# Patient Record
Sex: FEMALE | Race: WHITE | URBAN - NONMETROPOLITAN AREA
[De-identification: names, ages, dates, MRNs, and addresses within clinical notes are randomized per-mention and may not be internally consistent; named-entity substitution may affect disease eponyms.]

---

## 2017-11-07 ENCOUNTER — OFFICE VISIT (OUTPATIENT)
Dept: FAMILY MEDICINE CLINIC | Age: 62
End: 2017-11-07
Payer: MEDICAID

## 2017-11-07 VITALS
HEART RATE: 105 BPM | OXYGEN SATURATION: 96 % | BODY MASS INDEX: 35.32 KG/M2 | DIASTOLIC BLOOD PRESSURE: 80 MMHG | WEIGHT: 212 LBS | SYSTOLIC BLOOD PRESSURE: 114 MMHG | HEIGHT: 65 IN

## 2017-11-07 DIAGNOSIS — Z72.0 TOBACCO USE: ICD-10-CM

## 2017-11-07 DIAGNOSIS — G51.0 BELL'S PALSY: Primary | ICD-10-CM

## 2017-11-07 PROCEDURE — 99203 OFFICE O/P NEW LOW 30 MIN: CPT | Performed by: FAMILY MEDICINE

## 2017-11-07 RX ORDER — CYCLOBENZAPRINE HCL 10 MG
10 TABLET ORAL 3 TIMES DAILY PRN
COMMUNITY

## 2017-11-07 RX ORDER — MINERAL OIL AND WHITE PETROLATUM 150; 830 MG/G; MG/G
OINTMENT OPHTHALMIC
COMMUNITY

## 2017-11-07 RX ORDER — VALACYCLOVIR HYDROCHLORIDE 1 G/1
1000 TABLET, FILM COATED ORAL DAILY
COMMUNITY

## 2017-11-07 RX ORDER — PREDNISONE 20 MG/1
20 TABLET ORAL DAILY
COMMUNITY

## 2017-11-07 RX ORDER — POLYVINYL ALCOHOL 14 MG/ML
1 SOLUTION/ DROPS OPHTHALMIC 4 TIMES DAILY
COMMUNITY

## 2017-11-07 NOTE — PROGRESS NOTES
Subjective:      Patient ID: Nicolette Westbrook is a 63 y.o. female. 9 days ago started with headache and ear pain. Then developed facial drooping on her left side. No other neurological type problems. She went to the ER and had ct and mri of head and an echo. Was diagnosed with bells palsy. Her head pain is improving and per report her facial weakness is improving. Review of Systems   Neurological: Positive for weakness (facial). All other systems reviewed and are negative. Objective:   Physical Exam   Constitutional: She is oriented to person, place, and time. She appears well-developed and well-nourished. HENT:   Head: Normocephalic and atraumatic. Right Ear: External ear normal.   Left Ear: External ear normal.   Nose: Nose normal.   Mouth/Throat: Oropharynx is clear and moist. No oropharyngeal exudate. Eyes: Conjunctivae and EOM are normal. Pupils are equal, round, and reactive to light. Neck: Muscular tenderness present. No tracheal deviation and normal range of motion present. No thyromegaly present. Cardiovascular: Normal rate, regular rhythm and normal heart sounds. No murmur heard. Pulmonary/Chest: Effort normal and breath sounds normal.   Abdominal: Soft. Bowel sounds are normal.   Musculoskeletal: She exhibits no edema. Lymphadenopathy:     She has no cervical adenopathy. Neurological: She is alert and oriented to person, place, and time. Left sided facial droop   Skin: Skin is warm and dry. Psychiatric: She has a normal mood and affect. Nursing note and vitals reviewed. Assessment:      Left sided bells palsy  Tobacco use   Obese       Plan:      Reviewed proper eye care, home therapy for muscle stimulation. Offered therapy which was refused. Tobacco cessation. Proper diet fitness and weight loss discussed. Wellness issues addressed and encouraged.

## 2017-11-07 NOTE — PATIENT INSTRUCTIONS
mad at yourself if you smoke again. Make a list of things you learned and think about when you want to try again, such as next week, next month, or next year. Where can you learn more? Go to https://chairam.Soneter. org and sign in to your WhistleTalk account. Enter Z363 in the [facility] box to learn more about \"Stopping Smoking: Care Instructions. \"     If you do not have an account, please click on the \"Sign Up Now\" link. Current as of: March 20, 2017  Content Version: 11.3  © 8783-8886 [employer]. Care instructions adapted under license by [facility] ([facility]). If you have questions about a medical condition or this instruction, always ask your healthcare professional. [address] any warranty or liability for your use of this information. Patient Education        Bell's Palsy: Care Instructions  Your Care Instructions    Bell's palsy is paralysis or weakness of the muscles on one side of the face. Often people with Bell's palsy have a droop on one side of the mouth and have trouble completely shutting the eye on the same side. Bell's palsy can also interfere with the sense of taste. These things happen when a nerve in your face becomes inflamed. Bell's palsy is not caused by a stroke. The cause of the nerve inflammation is not known. But some experts think that a virus may cause it. Because of this, doctors sometimes prescribe antiviral medicine to treat it. You also may get medicine to reduce swelling. Bell's palsy usually gets better on its own in a few weeks or months. Follow-up care is a key part of your treatment and safety. Be sure to make and go to all appointments, and call your doctor if you are having problems. It's also a good idea to know your test results and keep a list of the medicines you take. How can you care for yourself at home? · Take your medicines exactly as prescribed.  Call your doctor if you think you are having a problem with your medicine. You will get more details on the specific medicines your doctor prescribes. · Use artificial tears or ointment if your eyes are too dry. Bell's palsy can make your lower eyelid droop, causing a dry eye. · If you cannot completely close your eye, consider using an eye patch while you sleep. · Help yourself blink by using your finger to close and open your eyelid. This may help keep your eye moist.  · Wear glasses or goggles to keep dust and dirt out of your eye. · As feeling comes back to your face, massage your forehead, cheeks, and lips. Massage may make the muscles in your face stronger. · Brush and floss your teeth often to help prevent tooth decay. Bell's palsy can dry up the spit on one side of your mouth. This increases the risk of tooth decay. When should you call for help? Call 911 anytime you think you may need emergency care. For example, call if:  · You have symptoms of a stroke. These may include:  ¨ Sudden numbness, tingling, weakness, or loss of movement in your face, arm, or leg, especially on only one side of your body. ¨ Sudden vision changes. ¨ Sudden trouble speaking. ¨ Sudden confusion or trouble understanding simple statements. ¨ Sudden problems with walking or balance. ¨ A sudden, severe headache that is different from past headaches. Call your doctor now or seek immediate medical care if:  · You have numbness or weakness that spreads beyond one side of your face. · You have a skin rash or eye pain or redness, or light bothers your eyes. · You have a new or worse headache. Watch closely for changes in your health, and be sure to contact your doctor if:  · You do not get better as expected. Where can you learn more? Go to https://[facility]am.[employer] H2O. org and sign in to your iHandle account. Enter P168 in the [email or web address] box to learn more about \"Bell's Palsy: Care Instructions. \"     If you do not have an account, please click on the \"Sign Up Now\" link. Current as of: October 14, 2016  Content Version: 11.3  © 1188-4953 [employer]. Care instructions adapted under license by [facility] ([facility]). If you have questions about a medical condition or this instruction, always ask your healthcare professional. [address] any warranty or liability for your use of this information. Patient Education        Well Visit, Women 48 to 72: Care Instructions  Your Care Instructions  Physical exams can help you stay healthy. Your doctor has checked your overall health and may have suggested ways to take good care of yourself. He or she also may have recommended tests. At home, you can help prevent illness with healthy eating, regular exercise, and other steps. Follow-up care is a key part of your treatment and safety. Be sure to make and go to all appointments, and call your doctor if you are having problems. It's also a good idea to know your test results and keep a list of the medicines you take. How can you care for yourself at home? · Reach and stay at a healthy weight. This will lower your risk for many problems, such as obesity, diabetes, heart disease, and high blood pressure. · Get at least 30 minutes of exercise on most days of the week. Walking is a good choice. You also may want to do other activities, such as running, swimming, cycling, or playing tennis or team sports. · Do not smoke. Smoking can make health problems worse. If you need help quitting, talk to your doctor about stop-smoking programs and medicines. These can increase your chances of quitting for good. · Protect your skin from too much sun. When you're outdoors from 10 a.m. to 4 p.m., stay in the shade or cover up with clothing and a hat with a wide brim. Wear sunglasses that block UV rays. Even when it's cloudy, put broad-spectrum sunscreen (SPF 30 or higher) on any exposed skin.   · See a dentist one or two times a year for checkups and to have your

## 2025-01-28 ENCOUNTER — OFFICE VISIT (OUTPATIENT)
Dept: FAMILY MEDICINE CLINIC | Age: 70
End: 2025-01-28
Payer: MEDICARE

## 2025-01-28 VITALS
WEIGHT: 228.4 LBS | HEIGHT: 66 IN | HEART RATE: 107 BPM | BODY MASS INDEX: 36.71 KG/M2 | RESPIRATION RATE: 20 BRPM | OXYGEN SATURATION: 96 % | SYSTOLIC BLOOD PRESSURE: 120 MMHG | DIASTOLIC BLOOD PRESSURE: 72 MMHG | TEMPERATURE: 98.1 F

## 2025-01-28 DIAGNOSIS — Z13.1 SCREENING FOR DIABETES MELLITUS: ICD-10-CM

## 2025-01-28 DIAGNOSIS — R73.03 PREDIABETES: ICD-10-CM

## 2025-01-28 DIAGNOSIS — R91.8 LUNG MASS: Primary | ICD-10-CM

## 2025-01-28 DIAGNOSIS — Z13.220 SCREENING FOR CHOLESTEROL LEVEL: ICD-10-CM

## 2025-01-28 DIAGNOSIS — Z76.89 ENCOUNTER TO ESTABLISH CARE: ICD-10-CM

## 2025-01-28 LAB — HBA1C MFR BLD: 6.1 %

## 2025-01-28 PROCEDURE — 4004F PT TOBACCO SCREEN RCVD TLK: CPT

## 2025-01-28 PROCEDURE — PBSHW POCT GLYCOSYLATED HEMOGLOBIN (HGB A1C)

## 2025-01-28 PROCEDURE — 83036 HEMOGLOBIN GLYCOSYLATED A1C: CPT

## 2025-01-28 PROCEDURE — 99204 OFFICE O/P NEW MOD 45 MIN: CPT

## 2025-01-28 PROCEDURE — G8427 DOCREV CUR MEDS BY ELIG CLIN: HCPCS

## 2025-01-28 PROCEDURE — 1160F RVW MEDS BY RX/DR IN RCRD: CPT

## 2025-01-28 PROCEDURE — 1090F PRES/ABSN URINE INCON ASSESS: CPT

## 2025-01-28 PROCEDURE — 3017F COLORECTAL CA SCREEN DOC REV: CPT

## 2025-01-28 PROCEDURE — G8417 CALC BMI ABV UP PARAM F/U: HCPCS

## 2025-01-28 PROCEDURE — 1159F MED LIST DOCD IN RCRD: CPT

## 2025-01-28 PROCEDURE — 1123F ACP DISCUSS/DSCN MKR DOCD: CPT

## 2025-01-28 PROCEDURE — G8400 PT W/DXA NO RESULTS DOC: HCPCS

## 2025-01-28 RX ORDER — AZITHROMYCIN 250 MG/1
TABLET, FILM COATED ORAL
Qty: 6 TABLET | Refills: 0 | Status: SHIPPED | OUTPATIENT
Start: 2025-01-28 | End: 2025-01-29

## 2025-01-28 RX ORDER — ACETAMINOPHEN 500 MG
500 TABLET ORAL EVERY 6 HOURS PRN
COMMUNITY

## 2025-01-28 SDOH — ECONOMIC STABILITY: FOOD INSECURITY: WITHIN THE PAST 12 MONTHS, THE FOOD YOU BOUGHT JUST DIDN'T LAST AND YOU DIDN'T HAVE MONEY TO GET MORE.: NEVER TRUE

## 2025-01-28 SDOH — ECONOMIC STABILITY: FOOD INSECURITY: WITHIN THE PAST 12 MONTHS, YOU WORRIED THAT YOUR FOOD WOULD RUN OUT BEFORE YOU GOT MONEY TO BUY MORE.: NEVER TRUE

## 2025-01-28 ASSESSMENT — PATIENT HEALTH QUESTIONNAIRE - PHQ9
SUM OF ALL RESPONSES TO PHQ QUESTIONS 1-9: 0
SUM OF ALL RESPONSES TO PHQ9 QUESTIONS 1 & 2: 0
2. FEELING DOWN, DEPRESSED OR HOPELESS: NOT AT ALL
1. LITTLE INTEREST OR PLEASURE IN DOING THINGS: NOT AT ALL
SUM OF ALL RESPONSES TO PHQ QUESTIONS 1-9: 0

## 2025-01-28 NOTE — PROGRESS NOTES
Tamar Humphrey (:  1955) is a 69 y.o. female,New patient, here for evaluation of the following chief complaint(s):  New Patient (Patient is here to establish a PCP.  Was  seen at [facility] ER on Thursday.  Was told she had pneumonia and a spot on her lungs.)         Assessment & Plan  Encounter to establish care   Patient has not been seen by a PCP since 2017. She is not currently on any medications. Care gaps were discussed during visit. She has never had a colonoscopy or colorectal cancer screening. This was discussed and patient educated on importance but at this time she is declining. She is unsure of the last time she had a mammogram. She states she does have a family history of breast cancer in her mother and sister. Discussed breast cancer screening and educated on the importance especially with a strong family history of breast cancer, but patient is declining screening. Patient is a daily 1 ppd smoker for the past 40 years. Smoking cessation discussed and she has thought about quitting in the past but is not ready right now.     Orders:    CBC with Auto Differential; Future    Comprehensive Metabolic Panel, Fasting; Future    Lung mass   Patient states she was seen at [facility] on Thursday for left sided chest pain and shortness of breath. She states they found a spot on her lungs that needed further evaluation. She states she was told she also had pneumonia and was given a dose of IV antibiotics in the hospital but was not discahrged with oral antibiotics. She states her symptoms have improved and she no longer has the chest pain or shortness of breath. Records requested from [facility] and patient instructed that we will call with the results and further plan.    Records received from [facility] after patient left office. Labs, VS, EKG, notes, medications, and imaging from ER visit were reviewed. CT Chest from [facility] shows dense consolidation and mass in the left upper lobe 7.7cm x 7.5cm that is concerning for

## 2025-01-29 ENCOUNTER — OFFICE VISIT (OUTPATIENT)
Dept: PULMONOLOGY | Age: 70
End: 2025-01-29
Payer: MEDICARE

## 2025-01-29 VITALS
DIASTOLIC BLOOD PRESSURE: 68 MMHG | HEART RATE: 90 BPM | OXYGEN SATURATION: 97 % | BODY MASS INDEX: 38.76 KG/M2 | WEIGHT: 241.2 LBS | HEIGHT: 66 IN | SYSTOLIC BLOOD PRESSURE: 110 MMHG

## 2025-01-29 DIAGNOSIS — R91.8 MASS OF UPPER LOBE OF LEFT LUNG: Primary | ICD-10-CM

## 2025-01-29 DIAGNOSIS — Z87.891 PERSONAL HISTORY OF TOBACCO USE, PRESENTING HAZARDS TO HEALTH: ICD-10-CM

## 2025-01-29 PROCEDURE — 99214 OFFICE O/P EST MOD 30 MIN: CPT | Performed by: INTERNAL MEDICINE

## 2025-01-29 PROCEDURE — 99406 BEHAV CHNG SMOKING 3-10 MIN: CPT | Performed by: INTERNAL MEDICINE

## 2025-01-29 RX ORDER — LEVOFLOXACIN 500 MG/1
500 TABLET, FILM COATED ORAL DAILY
Qty: 7 TABLET | Refills: 0 | Status: SHIPPED | OUTPATIENT
Start: 2025-01-29 | End: 2025-02-05

## 2025-01-29 ASSESSMENT — ENCOUNTER SYMPTOMS
ABDOMINAL PAIN: 0
NAUSEA: 0
COLOR CHANGE: 0
SORE THROAT: 0
CONSTIPATION: 0
VOMITING: 0
DIARRHEA: 0
SHORTNESS OF BREATH: 0
COUGH: 0
WHEEZING: 0

## 2025-01-29 NOTE — PROGRESS NOTES
REASON FOR THE CONSULTATION:  Chief Complaint   Patient presents with    Pulmonary Nodule     New patient - recently diagnosed with pneumonia      HISTORY OF PRESENT ILLNESS:    Tamar Humphrey is a 69 y.o. year old female   History of Present Illness  The patient presents for evaluation of a spot on her lung.    She experienced the onset of pain on Thursday night, prompting a visit to the emergency room where she was diagnosed with pneumonia. A subsequent CT scan revealed a pulmonary nodule, leading to a referral to our facility. She reports no history of tuberculosis or exposure to asbestos or silica. Her occupational history includes running a squad for 26 years, employment at a grocery store, a gas station, and a plastic pack. She reports no weight loss, hemoptysis, or phlegm production. She does not use an inhaler and does not experience dyspnea on exertion. The pain, which began on Thursday night, was not associated with any heavy lifting and occurred while she was seated. She is not currently on any antibiotics but plans to start a course this afternoon as prescribed by Dr. Guido for her pneumonia. She reports no fevers or chills. She has no known allergies to antibiotics.    She admits to occasional wheezing.    SOCIAL HISTORY  The patient smokes a pack a day and has smoked for at least 32 years. She does not do any drugs. She has a dog at home but no birds. She drinks alcohol occasionally, typically a glass of beer when dining out.    FAMILY HISTORY  There is no family history of lung cancer.    ALLERGIES  The patient has no known allergies.                    LUNG CANCER SCREENING     CRITERIA MET    []     CT ORDERED  []      CRITERIA NOT MET   []      REFUSED                    []        REASON CRITERIA NOT MET     SMOKING LESS THAN 30 PY  []      AGE LESS THAN 50 or GREATER 80 YEARS  []      QUIT SMOKING 15 YEARS OR GREATER   []      RECENT CT WITH IN 11 MONTHS    []      LIFE EXPECTANCY < 5 YEARS   []

## 2025-02-27 ENCOUNTER — TELEPHONE (OUTPATIENT)
Dept: FAMILY MEDICINE CLINIC | Age: 70
End: 2025-02-27

## 2025-02-27 NOTE — TELEPHONE ENCOUNTER
Called patient for follow up. Pulmonology note and order reviewed. She states she seen pulmonology. She has not had her PET CT scan done and states she does not want to at this time. Educated on importance of further diagnostic testing and possible lung cancer and she refuses at this time. She was instructed to return to office in the next week or see her pulmonologist again and she refuses. She states she feels better without pain or shortness of breath and she will return to office if she starts feeling bad again. Discussed in detail the risks of not further evaluating this condition and she verbalizes understanding.